# Patient Record
(demographics unavailable — no encounter records)

---

## 2024-10-29 NOTE — ADDENDUM
[FreeTextEntry1] :   I, Dr. Sudheer Bueno, personally discussed the patient with the fellow at the time of the visit. I agree with the assessment and plan as written unless noted below.  I was present with the fellow for the key portions of the history and exam.  I spent  a total of  40 min minutes evaluating the patient today. This includes spending 10 min on reviewing the patient's serial CT scans and PFT's before the patients visit.  The remaining time was spent with the patient in discussing etiology of her symptoms, showing the CT scans chest performed, reviewing the trends of PFTs, the importance of regular exercise on overall health and going over the important tests performed since the last visit. This time does not include interpreting or performing PFTs

## 2024-10-29 NOTE — ADDENDUM
[FreeTextEntry1] :   I, Dr. Sudheer Bueno, personally discussed the patient with the fellow at the time of the visit. I agree with the assessment and plan as written unless noted below.  I was present with the fellow for the key portions of the history and exam.  I spent  a total of  40 min minutes evaluating the patient today. This includes spending 10 min on reviewing the patient's serial CT scans and PFT's before the patients visit.  The remaining time was spent with the patient in discussing etiology of her symptoms, showing the CT scans chest performed, reviewing the trends of PFTs, the importance of regular exercise on overall health and going over the important tests performed since the last visit. This time does not include interpreting or performing PFTs      No

## 2024-10-29 NOTE — DISCUSSION/SUMMARY
[FreeTextEntry1] : ATTENDING'S SUMMARY: 10/29/24:   No pallor, no icterus. Mild infraorbital edema.  No cervical or supraclavicular lymphadenopathy. Distended external jugular vein, filling is from above.  No clubbing. No cyanosis. Good radial pulses.  No thickening of skin on dorsum of hand, no rheumatoid nodules.   No JVD, no hepatojugular reflux. No clinically detected hepatosplenomegaly.  No pitting edema noted bilaterally.   No Dullness. Good air entry bilaterally. Few inspiratory crackles at R base posteriorly, one fifth of the way up.  S1 S2 normal, no murmurs, rubs or gallops, P2 not loud or split.

## 2024-10-29 NOTE — PROCEDURE
[FreeTextEntry1] : CPET 24:  FVC 2.93 (101.4%) FEV1 1.84 (86.8%) VO2 1.3 (93%)  (75%) O2 Pulse 12.5 (124%) VT1 0.87 L/min (63%) Interpretation: Normal peak VO2, peak work and anaerobic threshold. Excess HR reserve due to beta blockade. Normal O2 pulse. Normal breathing reserve.  MIP/MEP 77/60  CT Chest 2024: 1. Mild centrilobular emphysema. 2. 6 mm groundglass nodule in the anterior segment right upper lobe of the lung. Few bilateral solid and groundglass micronodules. Recommend follow-up chest CT in 6-12 months. 3. Bilateral linear opacities some with some minimal associated traction bronchiectasis to suggest parenchymal scarring. 4. Severe coronary artery calcification/stents.  Sleep study 8/15/24  AHI 8.4 -> 16.2, mild to moderate sleep apnea.   [place] SpO2 84%, less than 1% spent at or below SpO2 of 88%     PFT 24 FVC: 2.85 L (99%)--> 2.84 L (98%)   FEV1: 1.80 L (85%)--> 1.92 L (90%)   FEV1/FVC: 63%--> 68% MYU26-10%: 0.58 L/s (18%)--> 0.65 L/s (20%) T.75 L (80%) RV/T% DLCO: 14.56 (73%)  6MWt 24: 433 meters, SpO2 95% -> 94%  Echo 2024 CONCLUSIONS:  1. Normal left and right ventricular size and systolic function.  2. Normal atria.  3. Aortic sclerosis without significant stenosis.  4. No other significant valvular disease.  5. No evidence of pulmonary hypertension.  6. No pericardial effusion.  7. The proximal ascending aorta is mildly dilated, 3.2 cm.  8. Compared to the previous TTE performed on 2023, there have been no significant interval changes.  Chest X-RAY PROCEDURE DATE: 2024  INTERPRETATION: Chest 2 views  HISTORY: Smoking history shortness of breath follow-up chest x-ray  IMPRESSION:  Mild hypoinflation. Vascular calcification thoracic aorta. No consolidation pleural effusion or pneumothorax. Unremarkable cardiac silhouette. No acute bone abnormality.  24 Echo CONCLUSIONS:  1. Normal left and right ventricular size and systolic function. 2. Normal atria. 3. Aortic sclerosis without significant stenosis. 4. No other significant valvular disease. 5. No evidence of pulmonary hypertension. 6. No pericardial effusion. 7. The proximal ascending aorta is mildly dilated, 3.2 cm. 8. Compared to the previous TTE performed on 2023, there have been no significant interval changes.

## 2024-10-29 NOTE — HISTORY OF PRESENT ILLNESS
[Former] : former [>= 20 pack years] : >= 20 pack years [TextBox_4] : 83 yo M former smoker (1 ppd x 40 years, quit 15 years ago), CAD (s/p multiple PCIs w/ stents and bypass) He had cardiac surgery about 8 months ago, had cardiac bypass surgery for the LAD. He noticed improvement in chest pain for the past few months, he has noticed shortness of breath after a few blocks Dr. Moe is his cardiologist Exercise includes: walking for 1/2 hour, stationary bike for 20 min, elliptical 20min He had COVID 2-3 times, last episode end of 2023 He didn't have shortness of breath before COVID  He owns an investment company  10/29/2024 Patient notes that his dyspnea on exertion appears much better now especially at flat surfaces, can walk 15-20 blocks, but still dyspneic on incline, can do 10-15 stairs. No cough, weight loss or other B symptoms. Brings up phlegm once in a while. Last saw cardiologist 4 months ago.  [TextBox_11] : 1 [TextBox_13] : 40 [YearQuit] : 2009

## 2024-10-29 NOTE — PROCEDURE
[FreeTextEntry1] : CPET 24:  FVC 2.93 (101.4%) FEV1 1.84 (86.8%) VO2 1.3 (93%)  (75%) O2 Pulse 12.5 (124%) VT1 0.87 L/min (63%) Interpretation: Normal peak VO2, peak work and anaerobic threshold. Excess HR reserve due to beta blockade. Normal O2 pulse. Normal breathing reserve.  MIP/MEP 77/60  CT Chest 2024: 1. Mild centrilobular emphysema. 2. 6 mm groundglass nodule in the anterior segment right upper lobe of the lung. Few bilateral solid and groundglass micronodules. Recommend follow-up chest CT in 6-12 months. 3. Bilateral linear opacities some with some minimal associated traction bronchiectasis to suggest parenchymal scarring. 4. Severe coronary artery calcification/stents.  Sleep study 8/15/24  AHI 8.4 -> 16.2, mild to moderate sleep apnea.   [place] SpO2 84%, less than 1% spent at or below SpO2 of 88%     PFT 24 FVC: 2.85 L (99%)--> 2.84 L (98%)   FEV1: 1.80 L (85%)--> 1.92 L (90%)   FEV1/FVC: 63%--> 68% DST30-92%: 0.58 L/s (18%)--> 0.65 L/s (20%) T.75 L (80%) RV/T% DLCO: 14.56 (73%)  6MWt 24: 433 meters, SpO2 95% -> 94%  Echo 2024 CONCLUSIONS:  1. Normal left and right ventricular size and systolic function.  2. Normal atria.  3. Aortic sclerosis without significant stenosis.  4. No other significant valvular disease.  5. No evidence of pulmonary hypertension.  6. No pericardial effusion.  7. The proximal ascending aorta is mildly dilated, 3.2 cm.  8. Compared to the previous TTE performed on 2023, there have been no significant interval changes.  Chest X-RAY PROCEDURE DATE: 2024  INTERPRETATION: Chest 2 views  HISTORY: Smoking history shortness of breath follow-up chest x-ray  IMPRESSION:  Mild hypoinflation. Vascular calcification thoracic aorta. No consolidation pleural effusion or pneumothorax. Unremarkable cardiac silhouette. No acute bone abnormality.  24 Echo CONCLUSIONS:  1. Normal left and right ventricular size and systolic function. 2. Normal atria. 3. Aortic sclerosis without significant stenosis. 4. No other significant valvular disease. 5. No evidence of pulmonary hypertension. 6. No pericardial effusion. 7. The proximal ascending aorta is mildly dilated, 3.2 cm. 8. Compared to the previous TTE performed on 2023, there have been no significant interval changes.

## 2024-10-29 NOTE — ASSESSMENT
[FreeTextEntry1] : 10/29/24  It was a pleasure to meet Layo today in consultation. His respiratory issues are summarized:  1. Shortness of breath Layo c/o shortness of breath. Possible causes we considered include:   a. COPD. He smoked 40 pack years, quit approx. 15 years ago. His PFT show mild obstruction. 6MWT does not show desaturation. It is unlikely his shortness of breath is due to pulmonary causes. We will order a low dose chest CT. b. Cardiac. He is s/p MIDCAB in 2023. He has a history of cardiac surgery in 2005 requiring stents. EF is 67%. We'll check a pro-BNP today c. Pulmonary hypertension. There was no evidence of pulmonary hypertension on recent echo. CPET was normal, though limited by taking blocker.  d. Pulmonary embolism. D-dimer was borderline at 249.  e. Collagen vascular disease. MEGAN negative, RF negative f. Diaphragmatic weakness. PIMax 77 normal.  g. Anemia. No evidence of anemia on recent labs  CT chest from 08/2024 showing mild airway thickening and centrilobular emphysema. Calcification in coronaries. Small 6mm GG nodule in RUL. There is mild GGO in dependent areas, more likely atelectasis.   Plan: - repeat CT chest in 1 year (08/2025) - start LAMA inhaler for mild COPD, though minimum symptoms may help preserve lung function - will repeat PFT with CT chest   2 Mild to moderate XAVI He has significant cardiac history. He takes a nap during the day. We will order a home sleep study to evaluate for XAVI. - discussed pros and cons of treating mild, and moderate sleep apnea. Shared decision made for PAP therapy. - will refer to Dr. Abrams for further XAVI care, PAP therapy.   Return to clinic: 12 months

## 2024-10-29 NOTE — PROCEDURE
[FreeTextEntry1] : CPET 24:  FVC 2.93 (101.4%) FEV1 1.84 (86.8%) VO2 1.3 (93%)  (75%) O2 Pulse 12.5 (124%) VT1 0.87 L/min (63%) Interpretation: Normal peak VO2, peak work and anaerobic threshold. Excess HR reserve due to beta blockade. Normal O2 pulse. Normal breathing reserve.  MIP/MEP 77/60  CT Chest 2024: 1. Mild centrilobular emphysema. 2. 6 mm groundglass nodule in the anterior segment right upper lobe of the lung. Few bilateral solid and groundglass micronodules. Recommend follow-up chest CT in 6-12 months. 3. Bilateral linear opacities some with some minimal associated traction bronchiectasis to suggest parenchymal scarring. 4. Severe coronary artery calcification/stents.  Sleep study 8/15/24  AHI 8.4 -> 16.2, mild to moderate sleep apnea.   [place] SpO2 84%, less than 1% spent at or below SpO2 of 88%     PFT 24 FVC: 2.85 L (99%)--> 2.84 L (98%)   FEV1: 1.80 L (85%)--> 1.92 L (90%)   FEV1/FVC: 63%--> 68% LNP47-54%: 0.58 L/s (18%)--> 0.65 L/s (20%) T.75 L (80%) RV/T% DLCO: 14.56 (73%)  6MWt 24: 433 meters, SpO2 95% -> 94%  Echo 2024 CONCLUSIONS:  1. Normal left and right ventricular size and systolic function.  2. Normal atria.  3. Aortic sclerosis without significant stenosis.  4. No other significant valvular disease.  5. No evidence of pulmonary hypertension.  6. No pericardial effusion.  7. The proximal ascending aorta is mildly dilated, 3.2 cm.  8. Compared to the previous TTE performed on 2023, there have been no significant interval changes.  Chest X-RAY PROCEDURE DATE: 2024  INTERPRETATION: Chest 2 views  HISTORY: Smoking history shortness of breath follow-up chest x-ray  IMPRESSION:  Mild hypoinflation. Vascular calcification thoracic aorta. No consolidation pleural effusion or pneumothorax. Unremarkable cardiac silhouette. No acute bone abnormality.  24 Echo CONCLUSIONS:  1. Normal left and right ventricular size and systolic function. 2. Normal atria. 3. Aortic sclerosis without significant stenosis. 4. No other significant valvular disease. 5. No evidence of pulmonary hypertension. 6. No pericardial effusion. 7. The proximal ascending aorta is mildly dilated, 3.2 cm. 8. Compared to the previous TTE performed on 2023, there have been no significant interval changes.